# Patient Record
Sex: FEMALE | Race: WHITE | Employment: FULL TIME | URBAN - METROPOLITAN AREA
[De-identification: names, ages, dates, MRNs, and addresses within clinical notes are randomized per-mention and may not be internally consistent; named-entity substitution may affect disease eponyms.]

---

## 2017-12-08 VITALS — BODY MASS INDEX: 25.71 KG/M2 | HEIGHT: 66 IN | WEIGHT: 160 LBS

## 2017-12-08 NOTE — PERIOP NOTES
Patient verified name, , and surgery as listed in [facility]. Type 2 surgery, phone assessment complete. Orders not received. Labs per surgeon: none  Labs per anesthesia protocol: hemoglobin to be drawn at GYN class      Patient answered medical/surgical history questions at their best of ability. All prior to admission medications documented in [facility]. Patient instructed to take the following medications the day of surgery according to anesthesia guidelines with a small sip of water: Claritin . Hold all vitamins 7 days prior to surgery and NSAIDS 5 days prior to surgery. Medications to be held none    Patient instructed on the following:  Arrive at A Entrance, time of arrival to be called the day before by 1700  NPO after midnight including gum, mints, and ice chips  Responsible adult must drive patient to the hospital, stay during surgery, and patient will  need supervision 24 hours after anesthesia  Use Hibiclens in shower the night before surgery and on the morning of surgery  Leave all valuables (money and jewelry) at home but bring insurance card and ID on       DOS  Do not wear make-up, nail polish, lotions, cologne, perfumes, powders, or oil on skin. Patient teach back successful and patient demonstrates knowledge of instruction.

## 2017-12-11 ENCOUNTER — HOSPITAL ENCOUNTER (OUTPATIENT)
Dept: SURGERY | Age: 37
Discharge: HOME | End: 2017-12-11

## 2017-12-14 RX ORDER — SODIUM CHLORIDE 0.9 % (FLUSH) 0.9 %
5-10 SYRINGE (ML) INJECTION AS NEEDED
Status: CANCELLED | OUTPATIENT
Start: 2017-12-14

## 2017-12-14 RX ORDER — SODIUM CHLORIDE 0.9 % (FLUSH) 0.9 %
5-10 SYRINGE (ML) INJECTION EVERY 8 HOURS
Status: CANCELLED | OUTPATIENT
Start: 2017-12-14

## 2017-12-15 ENCOUNTER — HOSPITAL ENCOUNTER (OUTPATIENT)
Dept: SURGERY | Age: 37
Discharge: HOME | End: 2017-12-15
Payer: COMMERCIAL

## 2017-12-15 LAB
ERYTHROCYTE [DISTWIDTH] IN BLOOD BY AUTOMATED COUNT: 13.3 % (ref 11.9–14.6)
HCT VFR BLD AUTO: 41.8 % (ref 35.8–46.3)
HGB BLD-MCNC: 13.8 G/DL (ref 11.7–15.4)
MCH RBC QN AUTO: 30.3 PG (ref 26.1–32.9)
MCHC RBC AUTO-ENTMCNC: 33 G/DL (ref 31.4–35)
MCV RBC AUTO: 91.7 FL (ref 79.6–97.8)
PLATELET # BLD AUTO: 276 K/UL (ref 150–450)
PMV BLD AUTO: 10.6 FL (ref 10.8–14.1)
RBC # BLD AUTO: 4.56 M/UL (ref 4.05–5.25)
WBC # BLD AUTO: 7 K/UL (ref 4.3–11.1)

## 2017-12-15 PROCEDURE — 85027 COMPLETE CBC AUTOMATED: CPT | Performed by: OBSTETRICS & GYNECOLOGY

## 2017-12-15 PROCEDURE — 36415 COLL VENOUS BLD VENIPUNCTURE: CPT | Performed by: OBSTETRICS & GYNECOLOGY

## 2017-12-15 NOTE — PERIOP NOTES
Cbc done today within anesthesia protocols for surgery.     Recent Results (from the past 12 hour(s))   CBC W/O DIFF    Collection Time: 12/15/17 12:13 PM   Result Value Ref Range    WBC 7.0 4.3 - 11.1 K/uL    RBC 4.56 4.05 - 5.25 M/uL    HGB 13.8 11.7 - 15.4 g/dL    HCT 41.8 35.8 - 46.3 %    MCV 91.7 79.6 - 97.8 FL    MCH 30.3 26.1 - 32.9 PG    MCHC 33.0 31.4 - 35.0 g/dL    RDW 13.3 11.9 - 14.6 %    PLATELET 616 401 - 031 K/uL    MPV 10.6 (L) 10.8 - 14.1 FL

## 2017-12-15 NOTE — PROGRESS NOTES
Patient attended GYN surgery orientation class today. Detailed instruction book regarding GYN surgery was provided at start of class. Class content included pre-operative instructions for surgery in the week prior to and day before surgery. Packet including Hibiclens and instructions on bathing was provided to patient. Detailed information was given regarding arriving at the hospital and instructions for the patient's day of surgery. Discussed recovery from surgery, hospital stay, pain management, and discharge. Reviewed recovery at home including pelvic rest, driving and activity restrictions, issues requiring call to physician etc. Real Seat all questions in detail. Patient voices understanding of all.

## 2017-12-27 ENCOUNTER — ANESTHESIA EVENT (OUTPATIENT)
Dept: SURGERY | Age: 37
End: 2017-12-27
Payer: COMMERCIAL

## 2017-12-27 RX ORDER — MIDAZOLAM HYDROCHLORIDE 1 MG/ML
2 INJECTION, SOLUTION INTRAMUSCULAR; INTRAVENOUS ONCE
Status: CANCELLED | OUTPATIENT
Start: 2017-12-27 | End: 2017-12-27

## 2017-12-28 ENCOUNTER — HOSPITAL ENCOUNTER (OUTPATIENT)
Age: 37
Setting detail: OBSERVATION
Discharge: HOME | End: 2017-12-29
Attending: OBSTETRICS & GYNECOLOGY | Admitting: OBSTETRICS & GYNECOLOGY
Payer: COMMERCIAL

## 2017-12-28 ENCOUNTER — ANESTHESIA (OUTPATIENT)
Dept: SURGERY | Age: 37
End: 2017-12-28
Payer: COMMERCIAL

## 2017-12-28 DIAGNOSIS — G89.18 POSTOPERATIVE PAIN: Primary | ICD-10-CM

## 2017-12-28 PROBLEM — N93.9 ABNORMAL UTERINE BLEEDING: Status: ACTIVE | Noted: 2017-12-28

## 2017-12-28 PROBLEM — N80.9 ENDOMETRIOSIS: Status: ACTIVE | Noted: 2017-12-28

## 2017-12-28 PROBLEM — R10.2 PELVIC PAIN: Status: ACTIVE | Noted: 2017-12-28

## 2017-12-28 LAB
ABO + RH BLD: NORMAL
BLOOD GROUP ANTIBODIES SERPL: NORMAL
HCG UR QL: NEGATIVE
SPECIMEN EXP DATE BLD: NORMAL

## 2017-12-28 PROCEDURE — 74011250636 HC RX REV CODE- 250/636: Performed by: OBSTETRICS & GYNECOLOGY

## 2017-12-28 PROCEDURE — 99218 HC RM OBSERVATION: CPT

## 2017-12-28 PROCEDURE — C1765 ADHESION BARRIER: HCPCS | Performed by: OBSTETRICS & GYNECOLOGY

## 2017-12-28 PROCEDURE — 74011250636 HC RX REV CODE- 250/636: Performed by: ANESTHESIOLOGY

## 2017-12-28 PROCEDURE — 77030034849: Performed by: OBSTETRICS & GYNECOLOGY

## 2017-12-28 PROCEDURE — 77030025088 HC APPL CLP LIG 1 COVD -B: Performed by: OBSTETRICS & GYNECOLOGY

## 2017-12-28 PROCEDURE — 77030035029 HC NDL INSUF VERES DISP COVD -B: Performed by: OBSTETRICS & GYNECOLOGY

## 2017-12-28 PROCEDURE — 74011250637 HC RX REV CODE- 250/637: Performed by: ANESTHESIOLOGY

## 2017-12-28 PROCEDURE — 94760 N-INVAS EAR/PLS OXIMETRY 1: CPT

## 2017-12-28 PROCEDURE — 88307 TISSUE EXAM BY PATHOLOGIST: CPT | Performed by: OBSTETRICS & GYNECOLOGY

## 2017-12-28 PROCEDURE — 77030034154 HC SHR COAG HARM ACE J&J -F: Performed by: OBSTETRICS & GYNECOLOGY

## 2017-12-28 PROCEDURE — 86900 BLOOD TYPING SEROLOGIC ABO: CPT | Performed by: ANESTHESIOLOGY

## 2017-12-28 PROCEDURE — 77030011640 HC PAD GRND REM COVD -A: Performed by: OBSTETRICS & GYNECOLOGY

## 2017-12-28 PROCEDURE — 74011000250 HC RX REV CODE- 250: Performed by: ANESTHESIOLOGY

## 2017-12-28 PROCEDURE — 77030018836 HC SOL IRR NACL ICUM -A: Performed by: OBSTETRICS & GYNECOLOGY

## 2017-12-28 PROCEDURE — 77030013288 HC BLN OCCL PERITNM COOP -B: Performed by: OBSTETRICS & GYNECOLOGY

## 2017-12-28 PROCEDURE — 77030019927 HC TBNG IRR CYSTO BAXT -A: Performed by: OBSTETRICS & GYNECOLOGY

## 2017-12-28 PROCEDURE — 76060000034 HC ANESTHESIA 1.5 TO 2 HR: Performed by: OBSTETRICS & GYNECOLOGY

## 2017-12-28 PROCEDURE — 77030008522 HC TBNG INSUF LAPRO STRY -B: Performed by: OBSTETRICS & GYNECOLOGY

## 2017-12-28 PROCEDURE — 77010033678 HC OXYGEN DAILY

## 2017-12-28 PROCEDURE — 74011250637 HC RX REV CODE- 250/637: Performed by: OBSTETRICS & GYNECOLOGY

## 2017-12-28 PROCEDURE — 77030032490 HC SLV COMPR SCD KNE COVD -B: Performed by: OBSTETRICS & GYNECOLOGY

## 2017-12-28 PROCEDURE — 77030035044 HC TRCR ENDOSC VRSPRT BLDLSS COVD -C: Performed by: OBSTETRICS & GYNECOLOGY

## 2017-12-28 PROCEDURE — 77030031139 HC SUT VCRL2 J&J -A: Performed by: OBSTETRICS & GYNECOLOGY

## 2017-12-28 PROCEDURE — 77030008756 HC TU IRR SUC STRY -B: Performed by: OBSTETRICS & GYNECOLOGY

## 2017-12-28 PROCEDURE — 77030008703 HC TU ET UNCUF COVD -A: Performed by: ANESTHESIOLOGY

## 2017-12-28 PROCEDURE — 74011000250 HC RX REV CODE- 250

## 2017-12-28 PROCEDURE — 74011250636 HC RX REV CODE- 250/636

## 2017-12-28 PROCEDURE — 77030014063 HC TIP MANIP UTER COOP -B: Performed by: OBSTETRICS & GYNECOLOGY

## 2017-12-28 PROCEDURE — 77030010351 HC TRCR ENDOSC VSTP COVD -B: Performed by: OBSTETRICS & GYNECOLOGY

## 2017-12-28 PROCEDURE — 81025 URINE PREGNANCY TEST: CPT

## 2017-12-28 PROCEDURE — 77030022704 HC SUT VLOC COVD -B: Performed by: OBSTETRICS & GYNECOLOGY

## 2017-12-28 PROCEDURE — 77030020782 HC GWN BAIR PAWS FLX 3M -B: Performed by: ANESTHESIOLOGY

## 2017-12-28 PROCEDURE — 77030010507 HC ADH SKN DERMBND J&J -B: Performed by: OBSTETRICS & GYNECOLOGY

## 2017-12-28 PROCEDURE — 77030009957 HC RELD ENDOSTCH COVD -C: Performed by: OBSTETRICS & GYNECOLOGY

## 2017-12-28 PROCEDURE — 76210000006 HC OR PH I REC 0.5 TO 1 HR: Performed by: OBSTETRICS & GYNECOLOGY

## 2017-12-28 PROCEDURE — 76010000162 HC OR TIME 1.5 TO 2 HR INTENSV-TIER 1: Performed by: OBSTETRICS & GYNECOLOGY

## 2017-12-28 RX ORDER — SODIUM CHLORIDE 0.9 % (FLUSH) 0.9 %
5-10 SYRINGE (ML) INJECTION AS NEEDED
Status: DISCONTINUED | OUTPATIENT
Start: 2017-12-28 | End: 2017-12-29 | Stop reason: HOSPADM

## 2017-12-28 RX ORDER — ESCITALOPRAM OXALATE 10 MG/1
10 TABLET ORAL DAILY
Status: DISCONTINUED | OUTPATIENT
Start: 2017-12-28 | End: 2017-12-29 | Stop reason: HOSPADM

## 2017-12-28 RX ORDER — NALOXONE HYDROCHLORIDE 0.4 MG/ML
0.4 INJECTION, SOLUTION INTRAMUSCULAR; INTRAVENOUS; SUBCUTANEOUS AS NEEDED
Status: DISCONTINUED | OUTPATIENT
Start: 2017-12-28 | End: 2017-12-29 | Stop reason: HOSPADM

## 2017-12-28 RX ORDER — OXYCODONE AND ACETAMINOPHEN 7.5; 325 MG/1; MG/1
1-2 TABLET ORAL
Status: DISCONTINUED | OUTPATIENT
Start: 2017-12-28 | End: 2017-12-29 | Stop reason: HOSPADM

## 2017-12-28 RX ORDER — FAMOTIDINE 20 MG/1
20 TABLET, FILM COATED ORAL ONCE
Status: COMPLETED | OUTPATIENT
Start: 2017-12-28 | End: 2017-12-28

## 2017-12-28 RX ORDER — PROPOFOL 10 MG/ML
INJECTION, EMULSION INTRAVENOUS AS NEEDED
Status: DISCONTINUED | OUTPATIENT
Start: 2017-12-28 | End: 2017-12-28 | Stop reason: HOSPADM

## 2017-12-28 RX ORDER — SODIUM CHLORIDE 0.9 % (FLUSH) 0.9 %
5-10 SYRINGE (ML) INJECTION EVERY 8 HOURS
Status: DISCONTINUED | OUTPATIENT
Start: 2017-12-28 | End: 2017-12-29 | Stop reason: HOSPADM

## 2017-12-28 RX ORDER — LIDOCAINE HYDROCHLORIDE 20 MG/ML
INJECTION, SOLUTION EPIDURAL; INFILTRATION; INTRACAUDAL; PERINEURAL AS NEEDED
Status: DISCONTINUED | OUTPATIENT
Start: 2017-12-28 | End: 2017-12-28 | Stop reason: HOSPADM

## 2017-12-28 RX ORDER — CEFAZOLIN SODIUM/WATER 2 G/20 ML
2 SYRINGE (ML) INTRAVENOUS ONCE
Status: COMPLETED | OUTPATIENT
Start: 2017-12-28 | End: 2017-12-28

## 2017-12-28 RX ORDER — GLYCOPYRROLATE 0.2 MG/ML
INJECTION INTRAMUSCULAR; INTRAVENOUS AS NEEDED
Status: DISCONTINUED | OUTPATIENT
Start: 2017-12-28 | End: 2017-12-28 | Stop reason: HOSPADM

## 2017-12-28 RX ORDER — DEXAMETHASONE SODIUM PHOSPHATE 4 MG/ML
INJECTION, SOLUTION INTRA-ARTICULAR; INTRALESIONAL; INTRAMUSCULAR; INTRAVENOUS; SOFT TISSUE AS NEEDED
Status: DISCONTINUED | OUTPATIENT
Start: 2017-12-28 | End: 2017-12-28 | Stop reason: HOSPADM

## 2017-12-28 RX ORDER — KETOROLAC TROMETHAMINE 30 MG/ML
INJECTION, SOLUTION INTRAMUSCULAR; INTRAVENOUS AS NEEDED
Status: DISCONTINUED | OUTPATIENT
Start: 2017-12-28 | End: 2017-12-28 | Stop reason: HOSPADM

## 2017-12-28 RX ORDER — FENTANYL CITRATE 50 UG/ML
INJECTION, SOLUTION INTRAMUSCULAR; INTRAVENOUS AS NEEDED
Status: DISCONTINUED | OUTPATIENT
Start: 2017-12-28 | End: 2017-12-28 | Stop reason: HOSPADM

## 2017-12-28 RX ORDER — DIPHENHYDRAMINE HCL 25 MG
25 CAPSULE ORAL
Status: DISCONTINUED | OUTPATIENT
Start: 2017-12-28 | End: 2017-12-29 | Stop reason: HOSPADM

## 2017-12-28 RX ORDER — ZOLPIDEM TARTRATE 5 MG/1
5 TABLET ORAL
Status: DISCONTINUED | OUTPATIENT
Start: 2017-12-28 | End: 2017-12-29 | Stop reason: HOSPADM

## 2017-12-28 RX ORDER — ONDANSETRON 2 MG/ML
4 INJECTION INTRAMUSCULAR; INTRAVENOUS
Status: DISCONTINUED | OUTPATIENT
Start: 2017-12-28 | End: 2017-12-29 | Stop reason: HOSPADM

## 2017-12-28 RX ORDER — NEOSTIGMINE METHYLSULFATE 1 MG/ML
INJECTION INTRAVENOUS AS NEEDED
Status: DISCONTINUED | OUTPATIENT
Start: 2017-12-28 | End: 2017-12-28 | Stop reason: HOSPADM

## 2017-12-28 RX ORDER — OXYCODONE HYDROCHLORIDE 5 MG/1
10 TABLET ORAL
Status: DISCONTINUED | OUTPATIENT
Start: 2017-12-28 | End: 2017-12-28 | Stop reason: HOSPADM

## 2017-12-28 RX ORDER — ROCURONIUM BROMIDE 10 MG/ML
INJECTION, SOLUTION INTRAVENOUS AS NEEDED
Status: DISCONTINUED | OUTPATIENT
Start: 2017-12-28 | End: 2017-12-28 | Stop reason: HOSPADM

## 2017-12-28 RX ORDER — FENTANYL CITRATE 50 UG/ML
100 INJECTION, SOLUTION INTRAMUSCULAR; INTRAVENOUS ONCE
Status: DISCONTINUED | OUTPATIENT
Start: 2017-12-28 | End: 2017-12-28 | Stop reason: HOSPADM

## 2017-12-28 RX ORDER — MIDAZOLAM HYDROCHLORIDE 1 MG/ML
2 INJECTION, SOLUTION INTRAMUSCULAR; INTRAVENOUS ONCE
Status: COMPLETED | OUTPATIENT
Start: 2017-12-28 | End: 2017-12-28

## 2017-12-28 RX ORDER — HYDROMORPHONE HYDROCHLORIDE 2 MG/ML
1 INJECTION, SOLUTION INTRAMUSCULAR; INTRAVENOUS; SUBCUTANEOUS
Status: DISCONTINUED | OUTPATIENT
Start: 2017-12-28 | End: 2017-12-29 | Stop reason: HOSPADM

## 2017-12-28 RX ORDER — SODIUM CHLORIDE, SODIUM LACTATE, POTASSIUM CHLORIDE, CALCIUM CHLORIDE 600; 310; 30; 20 MG/100ML; MG/100ML; MG/100ML; MG/100ML
75 INJECTION, SOLUTION INTRAVENOUS CONTINUOUS
Status: DISCONTINUED | OUTPATIENT
Start: 2017-12-28 | End: 2017-12-28 | Stop reason: HOSPADM

## 2017-12-28 RX ORDER — LIDOCAINE HYDROCHLORIDE 10 MG/ML
0.1 INJECTION INFILTRATION; PERINEURAL AS NEEDED
Status: DISCONTINUED | OUTPATIENT
Start: 2017-12-28 | End: 2017-12-28 | Stop reason: HOSPADM

## 2017-12-28 RX ORDER — HYDROMORPHONE HYDROCHLORIDE 2 MG/ML
0.5 INJECTION, SOLUTION INTRAMUSCULAR; INTRAVENOUS; SUBCUTANEOUS
Status: COMPLETED | OUTPATIENT
Start: 2017-12-28 | End: 2017-12-28

## 2017-12-28 RX ORDER — OXYCODONE HYDROCHLORIDE 5 MG/1
5 TABLET ORAL
Status: DISCONTINUED | OUTPATIENT
Start: 2017-12-28 | End: 2017-12-28 | Stop reason: HOSPADM

## 2017-12-28 RX ORDER — IBUPROFEN 800 MG/1
800 TABLET ORAL
Status: DISCONTINUED | OUTPATIENT
Start: 2017-12-28 | End: 2017-12-29 | Stop reason: HOSPADM

## 2017-12-28 RX ADMIN — FENTANYL CITRATE 50 MCG: 50 INJECTION, SOLUTION INTRAMUSCULAR; INTRAVENOUS at 09:09

## 2017-12-28 RX ADMIN — DEXAMETHASONE SODIUM PHOSPHATE 4 MG: 4 INJECTION, SOLUTION INTRA-ARTICULAR; INTRALESIONAL; INTRAMUSCULAR; INTRAVENOUS; SOFT TISSUE at 09:00

## 2017-12-28 RX ADMIN — HYDROMORPHONE HYDROCHLORIDE 0.5 MG: 2 INJECTION, SOLUTION INTRAMUSCULAR; INTRAVENOUS; SUBCUTANEOUS at 09:26

## 2017-12-28 RX ADMIN — HYDROMORPHONE HYDROCHLORIDE 1 MG: 2 INJECTION, SOLUTION INTRAMUSCULAR; INTRAVENOUS; SUBCUTANEOUS at 11:37

## 2017-12-28 RX ADMIN — FAMOTIDINE 20 MG: 20 TABLET ORAL at 06:36

## 2017-12-28 RX ADMIN — NEOSTIGMINE METHYLSULFATE 3 MG: 1 INJECTION INTRAVENOUS at 09:04

## 2017-12-28 RX ADMIN — DEXAMETHASONE SODIUM PHOSPHATE 8 MG: 4 INJECTION, SOLUTION INTRA-ARTICULAR; INTRALESIONAL; INTRAMUSCULAR; INTRAVENOUS; SOFT TISSUE at 08:06

## 2017-12-28 RX ADMIN — HYDROMORPHONE HYDROCHLORIDE 0.5 MG: 2 INJECTION, SOLUTION INTRAMUSCULAR; INTRAVENOUS; SUBCUTANEOUS at 09:46

## 2017-12-28 RX ADMIN — IBUPROFEN 800 MG: 800 TABLET ORAL at 11:36

## 2017-12-28 RX ADMIN — Medication 10 ML: at 18:02

## 2017-12-28 RX ADMIN — Medication 2 G: at 07:41

## 2017-12-28 RX ADMIN — OXYCODONE HYDROCHLORIDE AND ACETAMINOPHEN 2 TABLET: 7.5; 325 TABLET ORAL at 14:18

## 2017-12-28 RX ADMIN — LIDOCAINE HYDROCHLORIDE 100 MG: 20 INJECTION, SOLUTION EPIDURAL; INFILTRATION; INTRACAUDAL; PERINEURAL at 07:48

## 2017-12-28 RX ADMIN — IBUPROFEN 800 MG: 800 TABLET ORAL at 17:04

## 2017-12-28 RX ADMIN — MIDAZOLAM HYDROCHLORIDE 2 MG: 1 INJECTION, SOLUTION INTRAMUSCULAR; INTRAVENOUS at 07:08

## 2017-12-28 RX ADMIN — HYDROMORPHONE HYDROCHLORIDE 0.5 MG: 2 INJECTION, SOLUTION INTRAMUSCULAR; INTRAVENOUS; SUBCUTANEOUS at 09:56

## 2017-12-28 RX ADMIN — GLYCOPYRROLATE 0.4 MG: 0.2 INJECTION INTRAMUSCULAR; INTRAVENOUS at 09:04

## 2017-12-28 RX ADMIN — OXYCODONE HYDROCHLORIDE AND ACETAMINOPHEN 1 TABLET: 7.5; 325 TABLET ORAL at 21:46

## 2017-12-28 RX ADMIN — SODIUM CHLORIDE, SODIUM LACTATE, POTASSIUM CHLORIDE, AND CALCIUM CHLORIDE: 600; 310; 30; 20 INJECTION, SOLUTION INTRAVENOUS at 08:38

## 2017-12-28 RX ADMIN — HYDROMORPHONE HYDROCHLORIDE 1 MG: 2 INJECTION, SOLUTION INTRAMUSCULAR; INTRAVENOUS; SUBCUTANEOUS at 18:10

## 2017-12-28 RX ADMIN — PROPOFOL 160 MG: 10 INJECTION, EMULSION INTRAVENOUS at 07:48

## 2017-12-28 RX ADMIN — FENTANYL CITRATE 50 MCG: 50 INJECTION, SOLUTION INTRAMUSCULAR; INTRAVENOUS at 07:48

## 2017-12-28 RX ADMIN — Medication 1 G: at 09:03

## 2017-12-28 RX ADMIN — HYDROMORPHONE HYDROCHLORIDE 0.5 MG: 2 INJECTION, SOLUTION INTRAMUSCULAR; INTRAVENOUS; SUBCUTANEOUS at 09:31

## 2017-12-28 RX ADMIN — Medication 10 ML: at 21:46

## 2017-12-28 RX ADMIN — ROCURONIUM BROMIDE 40 MG: 10 INJECTION, SOLUTION INTRAVENOUS at 07:48

## 2017-12-28 RX ADMIN — LIDOCAINE HYDROCHLORIDE 0.1 ML: 10 INJECTION, SOLUTION INFILTRATION; PERINEURAL at 06:45

## 2017-12-28 RX ADMIN — SODIUM CHLORIDE, SODIUM LACTATE, POTASSIUM CHLORIDE, AND CALCIUM CHLORIDE 75 ML/HR: 600; 310; 30; 20 INJECTION, SOLUTION INTRAVENOUS at 06:46

## 2017-12-28 RX ADMIN — KETOROLAC TROMETHAMINE 30 MG: 30 INJECTION, SOLUTION INTRAMUSCULAR; INTRAVENOUS at 09:03

## 2017-12-28 NOTE — PROGRESS NOTES
TRANSFER - IN REPORT:    Verbal report received from CORTEZ RN (name) on Tiffany Olsen  being received from [employer]) for routine progression of care      Report consisted of patients Situation, Background, Assessment and   Recommendations(SBAR). Information from the following report(s) OR Summary and Intake/Output was reviewed with the receiving nurse. Opportunity for questions and clarification was provided. Assessment completed upon patients arrival to unit and care assumed.

## 2017-12-28 NOTE — ANESTHESIA PREPROCEDURE EVALUATION
Anesthetic History               Review of Systems / Medical History  Patient summary reviewed and pertinent labs reviewed    Pulmonary                   Neuro/Psych              Cardiovascular                  Exercise tolerance: >4 METS     GI/Hepatic/Renal                Endo/Other             Other Findings              Physical Exam    Airway  Mallampati: I  TM Distance: > 6 cm  Neck ROM: normal range of motion   Mouth opening: Normal     Cardiovascular  Regular rate and rhythm,  S1 and S2 normal,  no murmur, click, rub, or gallop  Rhythm: regular  Rate: normal         Dental  No notable dental hx       Pulmonary  Breath sounds clear to auscultation               Abdominal         Other Findings            Anesthetic Plan    ASA: 1  Anesthesia type: general            Anesthetic plan and risks discussed with: Patient

## 2017-12-28 NOTE — ANESTHESIA POSTPROCEDURE EVALUATION
Post-Anesthesia Evaluation and Assessment    Patient: Patrice Baltazar MRN: [id number]  SSN: [id number]    YOB: 1980  Age: 42 y.o. Sex: female       Cardiovascular Function/Vital Signs  Visit Vitals    /74    Pulse 77    Temp 36.7 °C (98.1 °F)    Resp 16    Ht 5' 6\" (1.676 m)    Wt 72.6 kg (160 lb)    SpO2 100%    BMI 25.82 kg/m2       Patient is status post general anesthesia for Procedure(s): HYSTERECTOMY TOTAL LAPAROSCOPIC BILATERAL SALPINGECTOMY POSS OOPHORECTOMY. Nausea/Vomiting: None    Postoperative hydration reviewed and adequate. Pain:  Pain Scale 1: Numeric (0 - 10) (12/28/17 1006)  Pain Intensity 1: 6 (12/28/17 1006)   Managed    Neurological Status:   Neuro (WDL): Within Defined Limits (12/28/17 1006)  Neuro  Neurologic State: Alert (12/28/17 1006)  Orientation Level: Oriented X4 (12/28/17 1006)   At baseline    Mental Status and Level of Consciousness: Arousable    Pulmonary Status:   O2 Device: Nasal cannula (12/28/17 1006)   Adequate oxygenation and airway patent    Complications related to anesthesia: None    Post-anesthesia assessment completed.  No concerns    Signed By: Claudia Galdamez MD     December 28, 2017

## 2017-12-28 NOTE — PERIOP NOTES
Notified family in waiting room of patient's condition and delay in discharge from PACU r/t lack of bed availability.

## 2017-12-28 NOTE — IP AVS SNAPSHOT
[address] [facility] [address] [facility] 
[phone] Patient: Marisabel Sigala MRN: [id number] KHS:1[birth year] My Medications STOP taking these medications   
 norethindrone-ethinyl estradiol 1 mg-20 mcg (21)/75 mg (7) Tab Commonly known as:  LOESTRIN FE 1/20 (28-DAY) TAKE these medications as instructed Instructions Each Dose to Equal  
 Morning Noon Evening Bedtime  
 escitalopram oxalate 10 mg tablet Commonly known as:  Gradie Kil Your last dose was: Your next dose is: Take 1 Tab by mouth daily. 10 mg  
    
   
   
   
  
 ibuprofen 800 mg tablet Commonly known as:  MOTRIN Your last dose was: Your next dose is: Take 1 Tab by mouth every six (6) hours as needed. 800 mg  
    
   
   
   
  
 loratadine 10 mg tablet Commonly known as:  Trino Jennifer Your last dose was: Your next dose is: Take 10 mg by mouth daily. Take / use AM day of surgery  per anesthesia protocols. Indications: Allergic Rhinitis 10 mg  
    
   
   
   
  
 oxyCODONE-acetaminophen 7.5-325 mg per tablet Commonly known as:  PERCOCET 7.5 Your last dose was: Your next dose is: Take 1-2 Tabs by mouth every six (6) hours as needed. 1-2 Tab Where to Get Your Medications Information on where to get these meds will be given to you by the nurse or doctor. ! Ask your nurse or doctor about these medications  
  ibuprofen 800 mg tablet  
 oxyCODONE-acetaminophen 7.5-325 mg per tablet

## 2017-12-28 NOTE — IP AVS SNAPSHOT
[address] 
[phone] Patient: Pipo Abdul MRN: [id number] LMD:8/9/[birth year]53 About your hospitalization You were admitted on:  December 28, 2017 You last received care in the:  [facility] You were discharged on:  December 29, 2017 Why you were hospitalized Your primary diagnosis was:  Endometriosis Your diagnoses also included:  Pelvic Pain, Abnormal Uterine Bleeding Things You Need To Do (next 8 weeks) Follow up with Zenobia Castle MD in 2 week(s) Phone:  [phone] Where:  [facility] [address], [place] [address] [facility],First Floor Thursday Jan 11, 2018 Global Post Op with Pasha Morales MD at 10:45 AM  
Where:  [facility] ([employer]) Discharge Orders None A check ginny indicates which time of day the medication should be taken. My Medications STOP taking these medications   
 norethindrone-ethinyl estradiol 1 mg-20 mcg (21)/75 mg (7) Tab Commonly known as:  LOESTRIN FE 1/20 (28-DAY) TAKE these medications as instructed Instructions Each Dose to Equal  
 Morning Noon Evening Bedtime  
 escitalopram oxalate 10 mg tablet Commonly known as:  Darcus Skillern Your last dose was: Your next dose is: Take 1 Tab by mouth daily. 10 mg  
    
   
   
   
  
 ibuprofen 800 mg tablet Commonly known as:  MOTRIN Your last dose was: Your next dose is: Take 1 Tab by mouth every six (6) hours as needed. 800 mg  
    
   
   
   
  
 loratadine 10 mg tablet Commonly known as:  Alyce Nay Your last dose was: Your next dose is: Take 10 mg by mouth daily. Take / use AM day of surgery  per anesthesia protocols. Indications: Allergic Rhinitis 10 mg  
    
   
   
   
  
 oxyCODONE-acetaminophen 7.5-325 mg per tablet Commonly known as:  PERCOCET 7.5 Your last dose was: Your next dose is: Take 1-2 Tabs by mouth every six (6) hours as needed. 1-2 Tab Where to Get Your Medications Information on where to get these meds will be given to you by the nurse or doctor. ! Ask your nurse or doctor about these medications  
  ibuprofen 800 mg tablet  
 oxyCODONE-acetaminophen 7.5-325 mg per tablet Discharge Instructions DISCHARGE SUMMARY from Nurse PATIENT INSTRUCTIONS: 
 
After general anesthesia or intravenous sedation, for 24 hours or while taking prescription Narcotics: · Limit your activities · Do not drive and operate hazardous machinery · Do not make important personal or business decisions · Do  not drink alcoholic beverages · If you have not urinated within 8 hours after discharge, please contact your surgeon on call. Report the following to your surgeon: 
· Excessive pain, swelling, redness or odor of or around the surgical area · Temperature over 100.5 · Nausea and vomiting lasting longer than 4 hours or if unable to take medications · Any signs of decreased circulation or nerve impairment to extremity: change in color, persistent  numbness, tingling, coldness or increase pain · Any questions What to do at Home: 
Recommended activity: Activity as tolerated and Activity as tolerated and no driving while on pain medications If you experience any of the following symptoms bright red heavy vaginal bleeding, foul smelling vaginal discharge, fever, pain not relieved by pain medication, increased redness, tenderness or oozing from incision site, inability to keep down food or liquids due to persistent nausea or vomiting, inability to urinate or pass stool please follow up with Dr. Candy Hopkins. *  Please give a list of your current medications to your Primary Care Provider. *  Please update this list whenever your medications are discontinued, doses are 
    changed, or new medications (including over-the-counter products) are added. *  Please carry medication information at all times in case of emergency situations. These are general instructions for a healthy lifestyle: No smoking/ No tobacco products/ Avoid exposure to second hand smoke Surgeon General's Warning:  Quitting smoking now greatly reduces serious risk to your health. Obesity, smoking, and sedentary lifestyle greatly increases your risk for illness A healthy diet, regular physical exercise & weight monitoring are important for maintaining a healthy lifestyle You may be retaining fluid if you have a history of heart failure or if you experience any of the following symptoms:  Weight gain of 3 pounds or more overnight or 5 pounds in a week, increased swelling in our hands or feet or shortness of breath while lying flat in bed. Please call your doctor as soon as you notice any of these symptoms; do not wait until your next office visit. Recognize signs and symptoms of STROKE: 
 
F-face looks uneven A-arms unable to move or move unevenly S-speech slurred or non-existent T-time-call 911 as soon as signs and symptoms begin-DO NOT go Back to bed or wait to see if you get better-TIME IS BRAIN. Warning Signs of HEART ATTACK Call 911 if you have these symptoms: 
? Chest discomfort. Most heart attacks involve discomfort in the center of the chest that lasts more than a few minutes, or that goes away and comes back. It can feel like uncomfortable pressure, squeezing, fullness, or pain. ? Discomfort in other areas of the upper body. Symptoms can include pain or discomfort in one or both arms, the back, neck, jaw, or stomach. ? Shortness of breath with or without chest discomfort. ? Other signs may include breaking out in a cold sweat, nausea, or lightheadedness. Don't wait more than five minutes to call [address]! Fast action can save your life. Calling 911 is almost always the fastest way to get lifesaving treatment. Emergency Medical Services staff can begin treatment when they arrive  up to an hour sooner than if someone gets to the hospital by car. The discharge information has been reviewed with the patient. The patient verbalized understanding. Discharge medications reviewed with the patient and appropriate educational materials and side effects teaching were provided. ___________________________________________________________________________________________________________________________________ [employer] Announcement We are excited to announce that we are making your provider's discharge notes available to you in [employer] You will see these notes when they are completed and signed by the physician that discharged you from your recent hospital stay. If you have any questions or concerns about any information you see in [employer] please call the Health Information Department where you were seen or reach out to your Primary Care Provider for more information about your plan of care. Introducing [facility]! Kusum Finch introduces [employer] patient portal. Now you can access parts of your medical record, email your doctor's office, and request medication refills online. 1. In your internet browser, go to [email or web address] 2. Click on the First Time User? Click Here link in the Sign In box. You will see the New Member Sign Up page. 3. Enter your [employer] Access Code exactly as it appears below. You will not need to use this code after youve completed the sign-up process. If you do not sign up before the expiration date, you must request a new code. · [employer] Access Code: [email or web address] Expires: 1/28/2018  2:31 PM 
 
4.  Enter the last four digits of your Social Security Number (xxxx) and Date of Birth (mm/dd/yyyy) as indicated and click Submit. You will be taken to the next sign-up page. 5. Create a [employer] ID. This will be your [employer] login ID and cannot be changed, so think of one that is secure and easy to remember. 6. Create a [employer] password. You can change your password at any time. 7. Enter your Password Reset Question and Answer. This can be used at a later time if you forget your password. 8. Enter your e-mail address. You will receive e-mail notification when new information is available in [address] 9. Click Sign Up. You can now view and download portions of your medical record. 10. Click the Download Summary menu link to download a portable copy of your medical information. If you have questions, please visit the Frequently Asked Questions section of the [employer] website. Remember, [employer] is NOT to be used for urgent needs. For medical emergencies, dial 911. Now available from your iPhone and Android! Providers Seen During Your Hospitalization Provider Specialty Primary office phone Juan Antonio Stanley MD Obstetrics & Gynecology [phone] Your Primary Care Physician (PCP) Primary Care Physician Office Phone Office Fax TERRY WATT ** None ** ** None ** You are allergic to the following Allergen Reactions Pcn (Penicillins) Nausea and Vomiting Recent Documentation Height Weight BMI OB Status Smoking Status 1.676 m 72.6 kg 25.82 kg/m2 Having regular periods Never Smoker Emergency Contacts Name Discharge Info Relation Home Work Mobile [facility]  Spouse [3] [phone] Patient Belongings The following personal items are in your possession at time of discharge: 
  Dental Appliances: None  Visual Aid: Contacts Discharge Instructions Attachments/References LAPAROSCOPIC HYSTERECTOMY: POST-OP (ENGLISH) Patient Handouts Laparoscopic Hysterectomy: What to Expect at [facility] Your Recovery A hysterectomy is surgery to take out the uterus. In somecases, the ovaries and fallopian tubes also are taken out at thesame time. You can expect to feel better and stronger each day, but you may need pain medicine for a week or two. You may get tired easily or have less energy than usual. The tiredness may last for several weeks after surgery. You will probably notice that your belly is swollen and puffy. This is common. The swelling will take several weeks to go down. You may take about 4 to 6 weeks to fully recover. It is important to avoid lifting while you are recovering so that you can heal. 
This care sheet gives you a general idea about how long it will take for you to recover. But each person recovers at a different pace. Follow the steps below to get better as quickly as possible. How can you care for yourself at home? Activity ? · Rest when you feel tired. ? · Be active. Walking is a good choice. ? · Allow the area to heal. Don't move quickly or lift anything heavy until you are feeling better. ? · You may shower 24 to 48 hours after surgery, if your doctor okays it. Pat the incision dry. Do not take a bath for the first 2 weeks, or until your doctor tells you it is okay. ? · Ask your doctor when it is okay for you to have sex. Diet ? · You can eat your normal diet. If your stomach is upset, try bland, low-fat foods like plain rice, broiled chicken, toast, and yogurt. ? · If your bowel movements are not regular right after surgery, try to avoid constipation and straining. Drink plenty of water. Your doctor may suggest fiber, a stool softener, or a mild laxative. Medicines ? · Your doctor will tell you if and when you can restart your medicines. He or she will also give you instructions about taking any new medicines.   
? · If you take blood thinners, such as warfarin (Coumadin), clopidogrel (Plavix), or aspirin, be sure to talk to your doctor. He or she will tell you if and when to start taking those medicines again. Make sure that you understand exactly what your doctor wants you to do. ? · Be safe with medicines. Read and follow all instructions on the label. ¨ If the doctor gave you a prescription medicine for pain, take it as prescribed. ¨ If you are not taking a prescription pain medicine, ask your doctor if you can take an over-the-counter medicine. ? · If your doctor prescribed antibiotics, take them as directed. Do not stop taking them just because you feel better. You need to take the full course of antibiotics. Incision care ? · You may have stitches over the cuts (incisions) the doctor made in your belly. ? · If you have strips of tape on the cut (incision) the doctor made, leave the tape on for a week or until it falls off.  
? · Wash the area daily with warm, soapy water, and pat it dry. Don't use hydrogen peroxide or alcohol. They can slow healing. ? · You may cover the area with a gauze bandage if it oozes fluid or rubs against clothing. ? · Change the bandage every day. Other instructions ? · You may have some light vaginal bleeding. Wear sanitary pads if needed. Do not douche or use tampons. ·  
? · Don't have sex until the doctor says it is okay. Follow-up care is a key part of your treatment and safety. Be sure to make and go to all appointments, and call your doctor if you are having problems. It's also a good idea to know your test results and keep a list of the medicines you take. When should you call for help? Call 911 anytime you think you may need emergency care. For example, call if: 
? · You passed out (lost consciousness). ? · You have chest pain, are short of breath, or cough up blood. ?Call your doctor now or seek immediate medical care if: 
? · You have pain that does not get better after you take pain medicine. ? · You cannot pass stoolsl or gas. ? · You have vaginal discharge that has increased in amount or smells bad.  
? · You are sick to your stomach or cannot drink fluids. ? · You have loose stitches, or your incision comes open. ? · Bright red blood has soaked through the bandage over your incision. ? · You have signs of infection, such as: 
¨ Increased pain, swelling, warmth, or redness. ¨ Red streaks leading from the incision. ¨ Pus draining from the incision. ¨ A fever. ? · You have bright red vaginal bleeding that soaks one or more pads in an hour, or you have large clots. ? · You have signs of a blood clot in your leg (called a deep vein thrombosis), such as: 
¨ Pain in your calf, back of the knee, thigh, or groin. ¨ Redness and swelling in your leg or groin. ? Watch closely for changes in your health, and be sure to contact your doctor if you have any problems. Where can you learn more? Go to http://jagjit-samson.info/. Enter Q131 in the search box to learn more about \"Laparoscopic Hysterectomy: What to Expect at Home. \" Current as of: October 13, 2016 Content Version: 11.4 © 8084-3680 [employer]. Care instructions adapted under license by [employer] (which disclaims liability or warranty for this information). If you have questions about a medical condition or this instruction, always ask your healthcare professional. [place] [address] any warranty or liability for your use of this information. Please provide this summary of care documentation to your next provider. Signatures-by signing, you are acknowledging that this After Visit Summary has been reviewed with you and you have received a copy. Patient Signature:  ____________________________________________________________ Date:  ____________________________________________________________  
  
Emily Barton  Provider Signature: ____________________________________________________________ Date:  ____________________________________________________________

## 2017-12-28 NOTE — PERIOP NOTES
TRANSFER - OUT REPORT:    Verbal report given to Ernestina Weems RN on [address]  being transferred to [facility] [phone] for routine post - op       Report consisted of patients Situation, Background, Assessment and   Recommendations(SBAR). Information from the following report(s) OR Summary, Procedure Summary, Intake/Output and MAR was reviewed with the receiving nurse. Opportunity for questions and clarification was provided.       Patient transported on room air

## 2017-12-28 NOTE — OP NOTES
Operative Note    Patient: Eloy Whiteer MRN: [id number]  SSN: [id number]    YOB: 1980  Age: 42 y.o. Sex: female      Date of Surgery: 12/28/2017      Preoperative Diagnosis: Pelvic pain in female [R10.2]  Endometriosis [N80.9], abnormal uterine bleeding    Postoperative Diagnosis: same    Surgeon(s):  Najma Díaz MD     Assistant: Trudi Fernandes    Anesthesia: General    Procedure: Total Laparoscopic Hysterectomy less than 250 grams, bilateral total salpingectomy    Findings: normal uterus and normal BSO    Estimated Blood Loss:  20cc    Drains: none    Specimens:    ID Type Source Tests Collected by Time Destination   1 : UTERUS, BILATERAL TUBES Fresh   Najma Díaz MD 12/28/2017 9105 Pathology       DVT Prophylaxis: SCD Hose    Antibiotic Prophylaxis:  Ancef    Procedure Details: The patient was taken to the operating room with intravenous fluids running, where she was administered general anesthesia in the supine position. A urinary catheter was placed in the bladder using sterile technique. She was then placed in the dorsal lithotomy position and prepped and draped in usual sterile fashion. Time out was done to confirm the operating procedure, surgeon, patient, pertinent data, and site. Once confirmed by the team, the procedure was started. A weighted speculum was placed in the vagina and the anterior aspect of the cervix was grasped with a tenaculum. The uterus was sounded to a depth of 7 cm and cervix dilated to [address] [place]. The Koh-Ring was placed and balloon inflated. Speculum and tenaculum removed. An infraumbilical incision was made with the knife. Veress needle was placed in the incision and pneumoperitoneum was created with an opening pressure of 3 mmHg. The Veress needle was then removed. 5 mm trocar was inserted. The scope was placed through the trocar and intra-abdominal placement was verified. There was no bleeding and no evidence of injury to the bowel.  10 mm blunt trocars were placed in the left and right lower quadrants in the same fashion, but under direct visualization. Findings were noted as above. One area white scars of endometriosis was seen left posterior cul de sac. The Harmonic Ace was used. The ureters were identified on either side. On each side, the uteroovarian ligament was clamped, coagulated, and divided using the Harmonic Ace. The dissection was then taken down through the fallopian tube, mesosalpinx, and round ligament. The anterior and posterior peritoneum was taken down on each side, skeletonizing the uterine arteries. The uterine arteries were clamped, coagulated, and cut across the ascending branches. Cardinal ligament was developed. Anterior and posterior colpotomies were made with the Harmonic Ace. The remainder of the cardinal and uterosacral ligaments were serially clamped, coagulated, and cut. When the specimen was free, it was delivered through the vagina. The vagina was occluded with asepto bulb to assist with maintaining the pneumoperitoneum. Each tube was elevated and Harmonic Ace was used to coag and cut along the mesosalpinx. Tubes were removed through lateral ports. The vaginal cuff was closed with running 0 VLoc on endostitch device. [employer] [place] with heat was used to coag a small oozing area at R round ligament pedicle. Hemostasis was achieved. The pelvis was irrigated with copious amounts of saline and suctioned away. The pneumoperitoneum was reduced to below 5 mmHg for several minutes, watching for bleeding. Hemostasis was assured. Intercede was then placed over the vaginal cuff. The pneumoperitoneum was reduced and the left and right lower trocars were removed under direct visualization. Once the pneumoperitoneum was completely reduced, the final trocar was removed. Skin of all incisions was closed with running SQ 4-0 vicryl and Dermabond. Urinary catheter was removed. 30 degree scope was inserted.  Bladder distended to accommodate approximately 150-200cc. No evidence of bladder injury and no sutures seen in the bladder. Both ureteral orifices visualized and ureteral patency confirmed. Bladder drained and scope removed. Urinary catheter reinserted. All sponge, lap, needle, and instrument counts were correct times two. Subsequently, the patient was cleaned up, returned to the supine position, awakened, and taken to the recovery room in stable condition.      Signed By: Byron Herrera MD     December 28, 2017

## 2017-12-28 NOTE — INTERVAL H&P NOTE
H&P Update:  Dallin Ryder was seen and examined. History and physical has been reviewed. The patient has been examined.  There have been no significant clinical changes since the completion of the originally dated History and Physical.    Signed By: Susan Malone MD     December 28, 2017 7:17 AM

## 2017-12-28 NOTE — H&P (VIEW-ONLY)
2017      Ashia Trinidad  : 1980  42 y.o.            Patient's last menstrual period was 2017 (exact date). Patient is scheduled for  tlh/bs for endometriosis, pain, abnl bldg. Please see pertinent office notes. Ultrasound shows nl uterus, adnexae. Previously tried interventions include bcp's. Last pap nl within the past yr  Contraception: OCP (Oral Contraceptive Pills)    ROS: Pertinent items are noted in the History of Present Illness. Allergies   Allergen Reactions    Pcn [Penicillins] Nausea and Vomiting     Current Outpatient Prescriptions   Medication Sig Dispense Refill    norethindrone-ethinyl estradiol (LOESTRIN FE , 28-DAY,) 1 mg-20 mcg (21)/75 mg (7) tab Take 1 Tab by mouth daily. (Patient taking differently: Take 1 Tab by mouth nightly. Indications: Pregnancy Contraception) 30 Tab 12    escitalopram oxalate (LEXAPRO) 10 mg tablet Take 1 Tab by mouth daily. (Patient taking differently: Take 10 mg by mouth nightly. Indications: ANXIETY WITH DEPRESSION) 30 Tab 12    loratadine (CLARITIN) 10 mg tablet Take 10 mg by mouth daily. Take / use AM day of surgery  per anesthesia protocols. Indications: Allergic Rhinitis       Past Medical History:   Diagnosis Date    Endometriosis     Infertility, female     IUGR (intrauterine growth restriction) 6/10/2015    PCB (post coital bleeding)     Pregnancy      Past Surgical History:   Procedure Laterality Date    HX COLONOSCOPY      HX DILATION AND CURETTAGE      Suction D&E    HX OTHER SURGICAL      HX OTHER SURGICAL      mole removal left shoulder    HX PELVIC LAPAROSCOPY      Laser laparoscopy and chromotubation for Endometriosis.     HX WISDOM TEETH EXTRACTION       Social History   Substance Use Topics    Smoking status: Never Smoker    Smokeless tobacco: Never Used    Alcohol use No         Exam:  Visit Vitals    /82    Wt 168 lb (76.2 kg)    LMP 2017 (Exact Date)    BMI 27.12 kg/m2     Body mass index is 27.12 kg/(m^2). Patient alert and oriented x 3. In no distress. Heart RRR, no murmur or gallop  Lungs clear to auscultation  Pelvic: nl bimanual exam    Proceed with the above for   Encounter Diagnoses   Name Primary?  Pelvic pain Yes    Endometriosis     Abnormal uterine bleeding        Reviewed the risks of surgery including anesthesia, bleeding, transfusion, infection,  1% risk vaginal dehiscence, open laparotomy in the event of a complication or complicated case, injury to vessels/nerves/ureters/bowel/bladder, dangerous blood clots, postop scar tissue causing chronic pain or pain with sex. Pt wants at least 1 ovary left in place if at all possible.

## 2017-12-28 NOTE — PROGRESS NOTES
Assessment complete via flow sheet. Pt drowsy&Ox4. Respirations even and unlabored, CTA. S1 S2 auscultated. Pt on 2L nasal canula. Pt reports pain level of 5 out of 10. Bowel sounds hypoactive, abdomen semi soft. Pt has 3 PS with derma bond. Pt has canales draining to gravity. Denies other needs. Bed in lowest position, side rails up 3, call bell in reach. Instructed to call for assistance. Pt verbalized understanding. Pt oriented to room, plan of care reviewed with patient and .

## 2017-12-29 VITALS
DIASTOLIC BLOOD PRESSURE: 69 MMHG | HEIGHT: 66 IN | WEIGHT: 160 LBS | TEMPERATURE: 97.8 F | RESPIRATION RATE: 16 BRPM | OXYGEN SATURATION: 94 % | HEART RATE: 66 BPM | SYSTOLIC BLOOD PRESSURE: 115 MMHG | BODY MASS INDEX: 25.71 KG/M2

## 2017-12-29 LAB
HCT VFR BLD AUTO: 35.8 % (ref 35.8–46.3)
HGB BLD-MCNC: 11.7 G/DL (ref 11.7–15.4)

## 2017-12-29 PROCEDURE — 99218 HC RM OBSERVATION: CPT

## 2017-12-29 PROCEDURE — 85018 HEMOGLOBIN: CPT | Performed by: OBSTETRICS & GYNECOLOGY

## 2017-12-29 PROCEDURE — 74011250636 HC RX REV CODE- 250/636: Performed by: OBSTETRICS & GYNECOLOGY

## 2017-12-29 PROCEDURE — 74011250637 HC RX REV CODE- 250/637: Performed by: OBSTETRICS & GYNECOLOGY

## 2017-12-29 PROCEDURE — 36415 COLL VENOUS BLD VENIPUNCTURE: CPT | Performed by: OBSTETRICS & GYNECOLOGY

## 2017-12-29 RX ORDER — OXYCODONE AND ACETAMINOPHEN 7.5; 325 MG/1; MG/1
1-2 TABLET ORAL
Qty: 40 TAB | Refills: 0 | Status: SHIPPED | OUTPATIENT
Start: 2017-12-29 | End: 2018-01-11

## 2017-12-29 RX ORDER — IBUPROFEN 800 MG/1
800 TABLET ORAL
Qty: 100 TAB | Refills: 2 | Status: SHIPPED | OUTPATIENT
Start: 2017-12-29 | End: 2018-02-13

## 2017-12-29 RX ADMIN — HYDROMORPHONE HYDROCHLORIDE 1 MG: 2 INJECTION, SOLUTION INTRAMUSCULAR; INTRAVENOUS; SUBCUTANEOUS at 03:33

## 2017-12-29 RX ADMIN — Medication 10 ML: at 06:20

## 2017-12-29 RX ADMIN — OXYCODONE HYDROCHLORIDE AND ACETAMINOPHEN 1 TABLET: 7.5; 325 TABLET ORAL at 09:08

## 2017-12-29 RX ADMIN — OXYCODONE HYDROCHLORIDE AND ACETAMINOPHEN 1 TABLET: 7.5; 325 TABLET ORAL at 14:55

## 2017-12-29 NOTE — DISCHARGE SUMMARY
Discharge Summary     Name: Patrice Baltazar MRN: [id number]  SSN: [id number]    YOB: 1980  Age: 42 y.o. Sex: female      Admit Date: 12/28/2017    Discharge Date: 12/29/2017      Admitting Physician: Jesse Nuñez MD     * Admission Diagnoses: endometriosis; pelvic pain; abnormal uterine bleeding    * Discharge Diagnoses:   Hospital Problems as of 12/29/2017  Date Reviewed: 12/14/2017          Codes Class Noted - Resolved POA    * (Principal)Endometriosis ICD-10-CM: N80.9  ICD-9-CM: 617.9  12/28/2017 - Present Yes        Pelvic pain ICD-10-CM: R10.2  ICD-9-CM: [id number]  12/28/2017 - Present Yes        Abnormal uterine bleeding ICD-10-CM: N93.9  ICD-9-CM: 626.9  12/28/2017 - Present Yes               * Procedures: TLH <250 mg    * Discharge Condition: [facility] Course: Normal hospital course for this procedure. Significant Diagnostic Studies:   Recent Results (from the past 24 hour(s))   HGB & HCT    Collection Time: 12/29/17  5:49 AM   Result Value Ref Range    HGB 11.7 11.7 - 15.4 g/dL    HCT 35.8 35.8 - 46.3 %       * Disposition: Home    Discharge Medications:   Current Discharge Medication List      START taking these medications    Details   ibuprofen (MOTRIN) 800 mg tablet Take 1 Tab by mouth every six (6) hours as needed. Qty: 100 Tab, Refills: 2      oxyCODONE-acetaminophen (PERCOCET 7.5) 7.5-325 mg per tablet Take 1-2 Tabs by mouth every six (6) hours as needed. Qty: 40 Tab, Refills: 0    Associated Diagnoses: Postoperative pain         CONTINUE these medications which have NOT CHANGED    Details   escitalopram oxalate (LEXAPRO) 10 mg tablet Take 1 Tab by mouth daily. Qty: 30 Tab, Refills: 12    Associated Diagnoses: Depression, unspecified depression type      loratadine (CLARITIN) 10 mg tablet Take 10 mg by mouth daily. Take / use AM day of surgery  per anesthesia protocols. Indications:  Allergic Rhinitis         STOP taking these medications       norethindrone-ethinyl estradiol (LOESTRIN FE 1/20, 28-DAY,) 1 mg-20 mcg (21)/75 mg (7) tab Comments:   Reason for Stopping:                * Follow-up Care/Patient Instructions: Activity: No sex, douching, or tampons for 6 weeks or as directed by your physician. No heavy lifting for 6 weeks. No driving while taking pain medication.   Diet: Resume pre-hospital diet  Wound Care: As directed    Follow-up Information     Follow up With Details Comments Contact Info    Arpit London MD              Signed By:  Ishan Miramontes MD     December 29, 2017

## 2017-12-29 NOTE — DISCHARGE INSTRUCTIONS
DISCHARGE SUMMARY from Nurse    PATIENT INSTRUCTIONS:    After general anesthesia or intravenous sedation, for 24 hours or while taking prescription Narcotics:  · Limit your activities  · Do not drive and operate hazardous machinery  · Do not make important personal or business decisions  · Do  not drink alcoholic beverages  · If you have not urinated within 8 hours after discharge, please contact your surgeon on call. Report the following to your surgeon:  · Excessive pain, swelling, redness or odor of or around the surgical area  · Temperature over 100.5  · Nausea and vomiting lasting longer than 4 hours or if unable to take medications  · Any signs of decreased circulation or nerve impairment to extremity: change in color, persistent  numbness, tingling, coldness or increase pain  · Any questions    What to do at Home:  Recommended activity: Activity as tolerated and Activity as tolerated and no driving while on pain medications    If you experience any of the following symptoms bright red heavy vaginal bleeding, foul smelling vaginal discharge, fever, pain not relieved by pain medication, increased redness, tenderness or oozing from incision site, inability to keep down food or liquids due to persistent nausea or vomiting, inability to urinate or pass stool please follow up with Dr. Gerry Morales. *  Please give a list of your current medications to your Primary Care Provider. *  Please update this list whenever your medications are discontinued, doses are      changed, or new medications (including over-the-counter products) are added. *  Please carry medication information at all times in case of emergency situations. These are general instructions for a healthy lifestyle:    No smoking/ No tobacco products/ Avoid exposure to second hand smoke  Surgeon General's Warning:  Quitting smoking now greatly reduces serious risk to your health.     Obesity, smoking, and sedentary lifestyle greatly increases your risk for illness    A healthy diet, regular physical exercise & weight monitoring are important for maintaining a healthy lifestyle    You may be retaining fluid if you have a history of heart failure or if you experience any of the following symptoms:  Weight gain of 3 pounds or more overnight or 5 pounds in a week, increased swelling in our hands or feet or shortness of breath while lying flat in bed. Please call your doctor as soon as you notice any of these symptoms; do not wait until your next office visit. Recognize signs and symptoms of STROKE:    F-face looks uneven    A-arms unable to move or move unevenly    S-speech slurred or non-existent    T-time-call 911 as soon as signs and symptoms begin-DO NOT go       Back to bed or wait to see if you get better-TIME IS BRAIN. Warning Signs of HEART ATTACK     Call 911 if you have these symptoms:   Chest discomfort. Most heart attacks involve discomfort in the center of the chest that lasts more than a few minutes, or that goes away and comes back. It can feel like uncomfortable pressure, squeezing, fullness, or pain.  Discomfort in other areas of the upper body. Symptoms can include pain or discomfort in one or both arms, the back, neck, jaw, or stomach.  Shortness of breath with or without chest discomfort.  Other signs may include breaking out in a cold sweat, nausea, or lightheadedness. Don't wait more than five minutes to call 911 - MINUTES MATTER! Fast action can save your life. Calling 911 is almost always the fastest way to get lifesaving treatment. Emergency Medical Services staff can begin treatment when they arrive -- up to an hour sooner than if someone gets to the hospital by car. The discharge information has been reviewed with the patient. The patient verbalized understanding.   Discharge medications reviewed with the patient and appropriate educational materials and side effects teaching were provided.   ___________________________________________________________________________________________________________________________________

## 2017-12-29 NOTE — PROGRESS NOTES
Pt in bed this AM,  remains at side. Pt with 3 PS all well approximated with dermabond. Pt has not passed gas. Pt alert and oriented. RR even, unlabored, no noted distress. Bed L/L, call bell is within reach and side rails are up x 2.

## 2017-12-29 NOTE — PROGRESS NOTES
Shift assessment completed via doc flow sheet. Patient is alert and oriented x 4. Respirations even and unlabored on room air. Lung sounds CTA bilaterally. Heart sounds S1, S2 auscultated and regular. Abdomen soft but tender. Bowel sounds hypoactive to all 4 quadrants. Patient has not voided yet. IV fluids infusing without difficulty. Patient ambulates to bathroom as needed with assistance. Patient c/o abdominal pain rated 4/10. Bed is locked and in low position. Bed rails x 3. Patient is encouraged to call for assistance. Call light within reach.

## 2017-12-29 NOTE — PROGRESS NOTES
Bedside report received from Sara Costello RN. Pt in bed resting, no noted distress.  at side. Bed L/L, call bell is within reach and side rails are up x 2.

## 2017-12-29 NOTE — PROGRESS NOTES
POST-OP PROGRESS NOTE   12/29/2017        Name: Jeanie Gongora  YHI:[id number]  Admission Date: 12/28/2017   Procedure: Procedure(s): HYSTERECTOMY TOTAL LAPAROSCOPIC BILATERAL SALPINGECTOMY POSS OOPHORECTOMY  POD: 1 Day Post-Op           Subjective:   Pain is being controlled with current medication. Patient has spontaneously voided. She is not complaining of urinary symptoms. Patient is passing flatus. She is on a regular diet. Objective:     Patient Vitals for the past 24 hrs:   Temp Pulse Resp BP SpO2   12/29/17 1146 97.8 °F (36.6 °C) 66 16 115/69 94 %   12/29/17 0749 96.9 °F (36.1 °C) 67 16 104/62 97 %   12/29/17 0303 98.4 °F (36.9 °C) 68 16 106/73 92 %   12/28/17 2358 98.6 °F (37 °C) 69 16 99/62 94 %   12/28/17 2006 98.5 °F (36.9 °C) 74 16 108/67 95 %   12/28/17 1556 98.6 °F (37 °C) 81 16 104/66 96 %     Physical Exam:          GEN: well developed and in no acute distress  ABDOMEN:  soft, non-tender  INCISION(S): dry and clean without erythema  EXTREMITIES:  denies pain; no sign of DVT    LAB  Recent Labs      12/29/17   0549   HGB  11.7   HCT  35.8       Assessment:     Hospital Problems  Date Reviewed: 12/14/2017          Codes Class Noted POA    * (Principal)Endometriosis ICD-10-CM: N80.9  ICD-9-CM: 617.9  12/28/2017 Yes        Pelvic pain ICD-10-CM: R10.2  ICD-9-CM: [id number]  12/28/2017 Yes        Abnormal uterine bleeding ICD-10-CM: N93.9  ICD-9-CM: 626.9  12/28/2017 Yes           . Now status post  Procedure(s): HYSTERECTOMY TOTAL LAPAROSCOPIC BILATERAL SALPINGECTOMY POSS OOPHORECTOMY  Plan:     Doing well. Will discharge today.      Malathi Watkins MD  12/29/2017  2:08 PM

## 2017-12-29 NOTE — PROGRESS NOTES
Discharge instructions given to and reviewed with pt and pts . Prescriptions x2 given to pt. Pt and  verbalize understanding of all teaching. Pt dressed and ready for discharge.

## 2018-01-11 PROBLEM — N93.9 ABNORMAL UTERINE BLEEDING: Status: RESOLVED | Noted: 2017-12-28 | Resolved: 2018-01-11

## 2018-03-22 ENCOUNTER — HOSPITAL ENCOUNTER (OUTPATIENT)
Dept: MAMMOGRAPHY | Age: 38
Discharge: HOME | End: 2018-03-22
Attending: OBSTETRICS & GYNECOLOGY
Payer: COMMERCIAL

## 2018-03-22 DIAGNOSIS — Z12.31 ENCOUNTER FOR SCREENING MAMMOGRAM FOR BREAST CANCER: ICD-10-CM

## 2018-03-22 PROCEDURE — 77067 SCR MAMMO BI INCL CAD: CPT

## 2018-03-25 PROBLEM — R92.2 DENSE BREAST TISSUE ON MAMMOGRAM: Status: ACTIVE | Noted: 2018-03-25

## 2019-03-26 ENCOUNTER — HOSPITAL ENCOUNTER (OUTPATIENT)
Dept: MAMMOGRAPHY | Age: 39
Discharge: HOME | End: 2019-03-26
Payer: COMMERCIAL

## 2019-03-26 DIAGNOSIS — Z12.39 SCREENING FOR BREAST CANCER: ICD-10-CM

## 2019-03-26 DIAGNOSIS — R92.2 DENSE BREAST TISSUE ON MAMMOGRAM: ICD-10-CM

## 2019-03-26 PROCEDURE — 77063 BREAST TOMOSYNTHESIS BI: CPT

## 2020-08-11 ENCOUNTER — HOSPITAL ENCOUNTER (OUTPATIENT)
Dept: MAMMOGRAPHY | Age: 40
Discharge: HOME | End: 2020-08-11
Attending: OBSTETRICS & GYNECOLOGY
Payer: COMMERCIAL

## 2020-08-11 DIAGNOSIS — Z12.39 SCREENING FOR BREAST CANCER: ICD-10-CM

## 2020-08-11 PROCEDURE — 77063 BREAST TOMOSYNTHESIS BI: CPT

## 2021-08-20 ENCOUNTER — TRANSCRIBE ORDER (OUTPATIENT)
Dept: SCHEDULING | Age: 41
End: 2021-08-20

## 2021-08-20 DIAGNOSIS — Z12.31 ENCOUNTER FOR SCREENING MAMMOGRAM FOR MALIGNANT NEOPLASM OF BREAST: Primary | ICD-10-CM

## 2021-09-16 ENCOUNTER — HOSPITAL ENCOUNTER (OUTPATIENT)
Dept: MAMMOGRAPHY | Age: 41
Discharge: HOME | End: 2021-09-16
Attending: OBSTETRICS & GYNECOLOGY
Payer: COMMERCIAL

## 2021-09-16 DIAGNOSIS — Z12.31 ENCOUNTER FOR SCREENING MAMMOGRAM FOR MALIGNANT NEOPLASM OF BREAST: ICD-10-CM

## 2021-09-16 PROCEDURE — 77063 BREAST TOMOSYNTHESIS BI: CPT

## (undated) DEVICE — TIP MANIP UTER RUMI 6.7MMX6CM --

## (undated) DEVICE — [HIGH FLOW INSUFFLATOR,  DO NOT USE IF PACKAGE IS DAMAGED,  KEEP DRY,  KEEP AWAY FROM SUNLIGHT,  PROTECT FROM HEAT AND RADIOACTIVE SOURCES.]: Brand: PNEUMOSURE

## (undated) DEVICE — SUTURING DEVICE: Brand: ENDO STITCH

## (undated) DEVICE — SHEARS ENDOSCP L36CM DIA5MM ULTRASONIC CRV TIP W/ ADV

## (undated) DEVICE — CONTAINER SPEC HISTOLOGY 900ML POLYPR

## (undated) DEVICE — FILTER SMK EVAC FLO CLR MEGADYNE

## (undated) DEVICE — CLIP APPLIER: Brand: ENDO CLIP II

## (undated) DEVICE — SUTURE VCRL SZ 4-0 L18IN ABSRB UD L19MM PS-2 3/8 CIR PRIM J496H

## (undated) DEVICE — SOLUTION IV 1000ML 0.9% SOD CHL

## (undated) DEVICE — BLLN OCCL PERITONM COLPOPNEUMO --

## (undated) DEVICE — 2, DISPOSABLE SUCTION/IRRIGATOR WITHOUT DISPOSABLE TIP: Brand: STRYKEFLOW

## (undated) DEVICE — KENDALL SCD EXPRESS SLEEVES, KNEE LENGTH, MEDIUM: Brand: KENDALL SCD

## (undated) DEVICE — DRAPE TWL SURG 16X26IN BLU ORB04] ALLCARE INC]

## (undated) DEVICE — BARRIER TISS ADH ABSRB 3X4IN -- GYNECARE INTERCEED

## (undated) DEVICE — BLADELESS OPTICAL TROCAR WITH FIXATION CANNULA: Brand: VERSAPORT

## (undated) DEVICE — (D)PREP SKN CHLRAPRP APPL 26ML -- CONVERT TO ITEM 371833

## (undated) DEVICE — SOLUTION IRRIG 3000ML 0.9% SOD CHL FLX CONT 0797208] ICU MEDICAL INC]

## (undated) DEVICE — INSUFFLATION NEEDLE: Brand: SURGINEEDLE

## (undated) DEVICE — DISSECTOR ENDOSCP TIP DIA10MM BLNT ENDOPATH

## (undated) DEVICE — DERMABOND SKIN ADH 0.7ML -- DERMABOND ADVANCED 12/BX

## (undated) DEVICE — DEVICE RELD SZ 0 L8IN GRN ABSRB FOR ENDO SILS STIT DEVS

## (undated) DEVICE — TRAY CATH 16F DRN BG LTX -- CONVERT TO ITEM 363158

## (undated) DEVICE — CYSTO/BLADDER IRRIGATION SET, REGULATING CLAMP

## (undated) DEVICE — SOL ANTI-FOG 6ML MEDC -- MEDICHOICE - CONVERT TO 358427

## (undated) DEVICE — BLADED TROCAR WITH FIXATION CANNULA: Brand: VERSAONE

## (undated) DEVICE — SYR 50ML LR LCK 1ML GRAD NSAF --

## (undated) DEVICE — REM POLYHESIVE ADULT PATIENT RETURN ELECTRODE: Brand: VALLEYLAB

## (undated) DEVICE — 2000CC GUARDIAN II: Brand: GUARDIAN

## (undated) DEVICE — SUTURE COAT VCRL SZ 4-0 L18IN ABSRB UD L19MM PS-2 1/2 CIR J496G

## (undated) DEVICE — TRAY PREP DRY W/ PREM GLV 2 APPL 6 SPNG 2 UNDPD 1 OVERWRAP

## (undated) DEVICE — SUTURE VCRL SZ 0 L36IN ABSRB UD L36MM CT-1 1/2 CIR J946H

## (undated) DEVICE — Device

## (undated) DEVICE — PERI-PAD,MODERATE: Brand: CURITY